# Patient Record
Sex: MALE | Race: WHITE | NOT HISPANIC OR LATINO
[De-identification: names, ages, dates, MRNs, and addresses within clinical notes are randomized per-mention and may not be internally consistent; named-entity substitution may affect disease eponyms.]

---

## 2017-06-19 ENCOUNTER — TRANSCRIPTION ENCOUNTER (OUTPATIENT)
Age: 67
End: 2017-06-19

## 2017-06-19 NOTE — ASU PATIENT PROFILE, ADULT - PSH
Artificial pacemaker    Cataract extraction status  bilateral  History of hernia repair    S/P AVR (aortic valve replacement)    S/P MVR (mitral valve replacement)

## 2017-06-19 NOTE — ASU PATIENT PROFILE, ADULT - PMH
Anemia    Epiglottitis    Essential hypertension    Gastroesophageal reflux disease, esophagitis presence not specified    HLD (hyperlipidemia)    HTN (hypertension)    Rheumatic fever with heart involvement

## 2017-06-20 ENCOUNTER — OUTPATIENT (OUTPATIENT)
Dept: OUTPATIENT SERVICES | Facility: HOSPITAL | Age: 67
LOS: 1 days | End: 2017-06-20
Payer: COMMERCIAL

## 2017-06-20 VITALS
DIASTOLIC BLOOD PRESSURE: 72 MMHG | OXYGEN SATURATION: 100 % | HEART RATE: 67 BPM | RESPIRATION RATE: 16 BRPM | SYSTOLIC BLOOD PRESSURE: 124 MMHG

## 2017-06-20 VITALS
RESPIRATION RATE: 12 BRPM | HEART RATE: 64 BPM | HEIGHT: 66 IN | SYSTOLIC BLOOD PRESSURE: 149 MMHG | OXYGEN SATURATION: 99 % | WEIGHT: 182.1 LBS | TEMPERATURE: 99 F | DIASTOLIC BLOOD PRESSURE: 84 MMHG

## 2017-06-20 DIAGNOSIS — Z98.890 OTHER SPECIFIED POSTPROCEDURAL STATES: Chronic | ICD-10-CM

## 2017-06-20 DIAGNOSIS — Z95.2 PRESENCE OF PROSTHETIC HEART VALVE: Chronic | ICD-10-CM

## 2017-06-20 DIAGNOSIS — Z95.0 PRESENCE OF CARDIAC PACEMAKER: Chronic | ICD-10-CM

## 2017-06-20 DIAGNOSIS — Z98.49 CATARACT EXTRACTION STATUS, UNSPECIFIED EYE: Chronic | ICD-10-CM

## 2017-06-20 DIAGNOSIS — H33.011 RETINAL DETACHMENT WITH SINGLE BREAK, RIGHT EYE: ICD-10-CM

## 2017-06-20 LAB
APTT BLD: 32.3 SEC — SIGNIFICANT CHANGE UP (ref 27.5–37.4)
INR BLD: 1.62 RATIO — HIGH (ref 0.88–1.16)
PROTHROM AB SERPL-ACNC: 17.8 SEC — HIGH (ref 9.8–12.7)

## 2017-06-20 PROCEDURE — 67108 REPAIR DETACHED RETINA: CPT | Mod: RT

## 2017-06-20 PROCEDURE — 85730 THROMBOPLASTIN TIME PARTIAL: CPT

## 2017-06-20 PROCEDURE — 85610 PROTHROMBIN TIME: CPT

## 2017-06-20 PROCEDURE — C1889: CPT

## 2017-06-20 NOTE — ASU DISCHARGE PLAN (ADULT/PEDIATRIC). - PT EDUC
other (specify)/Gas bubble instructions reviewed with good understanding ,gas bubble bracelet on pt. Eye shield with instructions , sunglasses and eye kit given to patient.

## 2017-06-20 NOTE — ASU DISCHARGE PLAN (ADULT/PEDIATRIC). - NOTIFY
Persistent Nausea and Vomiting/Pain not relieved by Medications/Bleeding that does not stop/Swelling that continues/Fever greater than 101

## 2019-08-05 PROBLEM — Z00.00 ENCOUNTER FOR PREVENTIVE HEALTH EXAMINATION: Status: ACTIVE | Noted: 2019-08-05

## 2019-08-05 PROBLEM — E78.5 HYPERLIPIDEMIA, UNSPECIFIED: Chronic | Status: ACTIVE | Noted: 2017-06-20

## 2019-08-05 PROBLEM — I01.9 ACUTE RHEUMATIC HEART DISEASE, UNSPECIFIED: Chronic | Status: ACTIVE | Noted: 2017-06-20

## 2019-08-05 PROBLEM — I10 ESSENTIAL (PRIMARY) HYPERTENSION: Chronic | Status: ACTIVE | Noted: 2017-06-20

## 2019-08-05 PROBLEM — D64.9 ANEMIA, UNSPECIFIED: Chronic | Status: ACTIVE | Noted: 2017-06-20

## 2019-08-07 ENCOUNTER — APPOINTMENT (OUTPATIENT)
Dept: PHYSICAL MEDICINE AND REHAB | Facility: CLINIC | Age: 69
End: 2019-08-07
Payer: MEDICARE

## 2019-08-07 VITALS — BODY MASS INDEX: 29.98 KG/M2 | HEIGHT: 67 IN | WEIGHT: 191 LBS

## 2019-08-07 DIAGNOSIS — Z86.79 PERSONAL HISTORY OF OTHER DISEASES OF THE CIRCULATORY SYSTEM: ICD-10-CM

## 2019-08-07 DIAGNOSIS — Z86.39 PERSONAL HISTORY OF OTHER ENDOCRINE, NUTRITIONAL AND METABOLIC DISEASE: ICD-10-CM

## 2019-08-07 PROCEDURE — 99214 OFFICE O/P EST MOD 30 MIN: CPT | Mod: 25

## 2019-08-07 PROCEDURE — 20552 NJX 1/MLT TRIGGER POINT 1/2: CPT

## 2019-08-07 RX ORDER — PANTOPRAZOLE SODIUM 40 MG/1
40 TABLET, DELAYED RELEASE ORAL
Refills: 0 | Status: ACTIVE | COMMUNITY

## 2019-08-07 RX ORDER — ATORVASTATIN CALCIUM 40 MG/1
40 TABLET, FILM COATED ORAL
Refills: 0 | Status: ACTIVE | COMMUNITY

## 2019-08-07 RX ORDER — LOSARTAN POTASSIUM AND HYDROCHLOROTHIAZIDE 25; 100 MG/1; MG/1
100-25 TABLET ORAL
Refills: 0 | Status: ACTIVE | COMMUNITY

## 2019-08-07 RX ORDER — CHROMIUM 200 MCG
TABLET ORAL
Refills: 0 | Status: ACTIVE | COMMUNITY

## 2019-08-07 RX ORDER — CETIRIZINE HYDROCHLORIDE 10 MG/1
CAPSULE, LIQUID FILLED ORAL
Refills: 0 | Status: ACTIVE | COMMUNITY

## 2019-08-07 RX ORDER — DOCUSATE SODIUM 100 MG/1
100 CAPSULE ORAL
Refills: 0 | Status: ACTIVE | COMMUNITY

## 2019-08-07 NOTE — PROCEDURE
[de-identified] : After informed consent,he elected to proceed with a trigger point injection into the left L5 paraspinals. I confirmed no prior adverse reactions, no active infections, and no relevant allergies. \par  \par The skin was prepped in the usual sterile manner. The sites were injected with local anesthetic followed by local needling. The injection was completed without complication and a bandage was applied. He tolerated the procedure well and was given post-injection instructions. \par  \par Cold Tx x 48 hours, analgesics prn. Medications: 0.5 ml of 1% Lidocaine per site\par \par \par

## 2019-08-07 NOTE — PHYSICAL EXAM
[FreeTextEntry1] : SIMA is a 69 year male \par Constitutional: healthy appearing, NAD, and overweight\par \par LUMBAR\par ROM: flexion to 30 deg, ext to 5 deg\par \par Gait: normal\par \par Inspection: no erythema, warmth\par Spine: no TTP in spinous process, SI joint, sacrum\par Bony palpation: no TTP in GT\par \par Soft tissue palpation hip: no TTP in gluteus sidra, medius\par Soft tissue palpation of spine: no TTP in lumbar paraspinals\par \par 5/5 bilateral HF, KE, DF, PF \par sensation intact in bilat LE\par reflexes: knee and ankle 0 bilat\par \par Special tests: neg seated SLR\par \par

## 2019-08-13 ENCOUNTER — APPOINTMENT (OUTPATIENT)
Dept: PHYSICAL MEDICINE AND REHAB | Facility: CLINIC | Age: 69
End: 2019-08-13
Payer: MEDICARE

## 2019-08-13 PROCEDURE — 64483 NJX AA&/STRD TFRM EPI L/S 1: CPT | Mod: LT

## 2019-08-13 PROCEDURE — 64484 NJX AA&/STRD TFRM EPI L/S EA: CPT | Mod: LT

## 2019-09-26 ENCOUNTER — APPOINTMENT (OUTPATIENT)
Dept: PHYSICAL MEDICINE AND REHAB | Facility: CLINIC | Age: 69
End: 2019-09-26
Payer: COMMERCIAL

## 2019-09-26 PROCEDURE — 99214 OFFICE O/P EST MOD 30 MIN: CPT

## 2019-09-26 NOTE — ASSESSMENT
[FreeTextEntry1] : Had episode of 5 days of Afib.  Surgeon said no blood thinners needed while Cardiologist said he needs it. Taught wall squat.  Educated to lose weight.

## 2019-09-26 NOTE — PHYSICAL EXAM
[FreeTextEntry1] : SIMA is a 69 year male \par Constitutional: healthy appearing, NAD, and overweight\par \par LUMBAR\par ROM: flexion to 30 deg, ext to 5 deg\par \par Gait: normal\par \par Inspection: no erythema, warmth\par Spine: no TTP in spinous process, SI joint, sacrum\par Bony palpation: no TTP in GT\par \par Soft tissue palpation hip: no TTP in gluteus sidra, medius\par Soft tissue palpation of spine: no TTP in lumbar paraspinals\par \par 5/5 bilateral HF, KE, DF, PF \par sensation intact in bilat LE\par reflexes: knee and ankle 0 bilat\par \par Special tests: neg seated SLR\par

## 2019-09-26 NOTE — HISTORY OF PRESENT ILLNESS
[FreeTextEntry1] : Location: back\par Quality: sharp \par Severity: no pain\par Duration: over 2 yr \par Timing: recurrent \par Context: atraumatic \par Aggravating Factors: standing; walking \par Alleviating Factors: narcotics; BENITA \par Associated Symptoms: no weakness; no numbness/tingling; no locking; no weight loss; no fever; no chills; no change in bowel/bladder habits; no swelling; no redness; no warmth; no popping; no clicking; no ecchymosis; +radiation down to left groin; stiffness\par Prior Studies: CT scan\par \par 3/2015 treated with Abx for suspected pneumonia but pain did not resolve. GI doctor got a CT scan showing diverticulitis. Went to ER as well.\par 8/2017 right L5 and S1 BENITA - 100% relief\par 8/2019 left L5 and S1 BENITA - 100% relief after 1 wk, it was severe around day 4-5

## 2020-12-29 ENCOUNTER — APPOINTMENT (OUTPATIENT)
Dept: ORTHOPEDIC SURGERY | Facility: CLINIC | Age: 70
End: 2020-12-29
Payer: COMMERCIAL

## 2020-12-29 VITALS — HEIGHT: 67 IN | BODY MASS INDEX: 29.98 KG/M2 | WEIGHT: 191 LBS

## 2020-12-29 VITALS — TEMPERATURE: 93.6 F

## 2020-12-29 DIAGNOSIS — M75.50 BURSITIS OF UNSPECIFIED SHOULDER: ICD-10-CM

## 2020-12-29 PROCEDURE — 99072 ADDL SUPL MATRL&STAF TM PHE: CPT

## 2020-12-29 PROCEDURE — 99203 OFFICE O/P NEW LOW 30 MIN: CPT

## 2020-12-29 PROCEDURE — 73060 X-RAY EXAM OF HUMERUS: CPT | Mod: LT

## 2020-12-30 PROBLEM — M75.50 SHOULDER BURSITIS: Status: ACTIVE | Noted: 2020-12-30

## 2020-12-30 NOTE — HISTORY OF PRESENT ILLNESS
[de-identified] : Location: Left lateral arm\par Duration: 1 month\par Context: atraumatic\par Quality: burning, stiffness\par Aggravating factors: carrying heavy bags, reaching behind\par Associated symptoms: N/A\par Conservative treatment: icy hot patch, Tylenol\par Prior studies: N/A

## 2020-12-30 NOTE — PHYSICAL EXAM
[de-identified] : Left Shoulder:\par Constitutional:\par The patient is healthy-appearing and in no apparent distress. \par \par Cardiovascular System: \par The capillary refill is less than 2 seconds. \par \par Skin: \par There are no skin abnormalities.\par \par C-Spine/Neck:\par \par Active Range of Motion:\par Flexion				50\par Extension			60\par Lateral rotation			80  \par \par Left Shoulder: \par Inspection: \par There is no atrophy, erythema, warmth, swelling.\par There is no scapular winging.\par There is no AC prominence. \par \par Bony Palpation: \par There is no tenderness of the clavicle.\par There is no tenderness of the acromioclavicular joint.\par There is no tenderness of the greater tuberosity. \par There is no tenderness of the bicipital groove.\par  \par Soft Tissue Palpation: \par There is no tenderness of the trapezius.\par There is no tenderness of the rhomboid.\par There is no tenderness of the subacromial bursa. \par \par Active Range of Motion: \par Forward flexion- 				180 \par Abduction-					150\par External rotation at 0 degrees abduction-	80 \par Internal rotation at 0 degrees abduction-	80\par \par Passive Range of Motion: \par Forward flexion- 			180 \par Abduction-				150\par External rotation at 0 deg abduction-	80 \par Internal rotation at 0 deg abduction-	80\par \par Strength:\par Supraspinatus / Abduction                  5/5\par External rotation                                 5/5\par Internal roation                                   5/5\par \par Special Tests: \par Hawkin's  				Positive \par Neer's  				Positive \par Speed's  				Negative\par AC cross-over 			            Negative\par Morrill's  				Negative\par \par Neurological System: \par \par There is normal sensation to light touch C5-T1. \par \par Stability: \par There is no general laxity. \par \par Psychiatric: \par The patient demonstrates a normal mood and affect and is active and alert [de-identified] : X-ray left humerus.  There is no significant bony / soft tissue abnormality, arthritis, or fracture other than a pacemaker.

## 2020-12-30 NOTE — ASSESSMENT
[FreeTextEntry1] : Discussed at length with patient exam history and imaging.  Patient this time elects home exercises and if no improvement consideration to CT as patient is unable to obtain an MRI.

## 2021-03-17 ENCOUNTER — APPOINTMENT (OUTPATIENT)
Dept: PHYSICAL MEDICINE AND REHAB | Facility: CLINIC | Age: 71
End: 2021-03-17
Payer: COMMERCIAL

## 2021-03-17 DIAGNOSIS — M25.859 OTHER SPECIFIED JOINT DISORDERS, UNSPECIFIED HIP: ICD-10-CM

## 2021-03-17 DIAGNOSIS — Z86.03 PERSONAL HISTORY OF NEOPLASM OF UNCERTAIN BEHAVIOR: ICD-10-CM

## 2021-03-17 DIAGNOSIS — Z85.46 PERSONAL HISTORY OF MALIGNANT NEOPLASM OF PROSTATE: ICD-10-CM

## 2021-03-17 PROCEDURE — 99072 ADDL SUPL MATRL&STAF TM PHE: CPT

## 2021-03-17 PROCEDURE — 20552 NJX 1/MLT TRIGGER POINT 1/2: CPT

## 2021-03-17 PROCEDURE — 73502 X-RAY EXAM HIP UNI 2-3 VIEWS: CPT | Mod: LT

## 2021-03-17 PROCEDURE — 72100 X-RAY EXAM L-S SPINE 2/3 VWS: CPT

## 2021-03-17 PROCEDURE — 99214 OFFICE O/P EST MOD 30 MIN: CPT | Mod: 25

## 2021-03-17 RX ORDER — METRONIDAZOLE 10 MG/G
GEL TOPICAL
Refills: 0 | Status: ACTIVE | COMMUNITY

## 2021-03-17 RX ORDER — HYDROCODONE BITARTRATE AND ACETAMINOPHEN 5; 325 MG/1; MG/1
5-325 TABLET ORAL EVERY 8 HOURS
Qty: 21 | Refills: 0 | Status: DISCONTINUED | COMMUNITY
Start: 2019-08-07 | End: 2021-03-17

## 2021-03-17 NOTE — PHYSICAL EXAM
[FreeTextEntry1] : SIMA is a 70 year male \par Constitutional: healthy appearing, NAD, and overweight\par \par LUMBAR\par ROM: flexion to 30 deg, ext to 5 deg\par \par Gait: normal\par \par Inspection: no erythema, warmth\par Spine: no TTP in spinous process, SI joint, sacrum\par Bony palpation: no TTP in GT\par \par Soft tissue palpation hip: no TTP in gluteus sidra, medius\par Soft tissue palpation of spine: TTP in left lumbar paraspinals\par \par 5/5 bilateral HF, KE, DF, PF \par sensation intact in bilat LE\par reflexes: knee and ankle 0 bilat\par \par Special tests: neg seated SLR, neg FADIR in left hip

## 2021-03-17 NOTE — ASSESSMENT
[FreeTextEntry1] : MGUS and prostate cancer are stable and he is doing active surveillance.\par \par Hip impingement is asymptomatic at this time.\par \par Ice area often. Take tylenol prn.  Has to limit nsaids.    Continue HEP. \par \par Having 2nd vaccine April 2 so can do BENITA 2 wks after. \par \par Does not have PCP.  See cardiologist for aortic calcification. Wrote it down for him. \par \par

## 2021-03-17 NOTE — DATA REVIEWED
[Plain X-Rays] : plain X-Rays [FreeTextEntry1] : Office x-rays of the lumbar spine AP and lateral show bridging osteophytes from T12-L3, anterior osteophytes at the other lumbar levels mild DDD at L5-S1.  Incident mild aortic calcification.\par \par Office X-rays of the left hip AP and lateral show osteophytes at the lateral acetabulum causing mild arthritis, mild cam impingement

## 2021-03-17 NOTE — HISTORY OF PRESENT ILLNESS
[FreeTextEntry1] : Location: back\par Quality: sharp \par Severity: 7/10, worse last couple months after prostate biopsy\par Duration: over 2 yr \par Timing: recurrent \par Context: atraumatic \par Aggravating Factors: standing; walking \par Alleviating Factors: narcotics; BENITA \par Associated Symptoms: no weakness; no numbness/tingling; no locking; no weight loss; no fever; no chills; no change in bowel/bladder habits; +radiation down to left groin; stiffness\par Prior Studies: CT scan 2017\par \par 3/2015 treated with Abx for suspected pneumonia but pain did not resolve. GI doctor got a CT scan showing diverticulitis. Went to ER as well.\par 8/2017 right L5 and S1 BENITA - 100% relief\par 8/2019 left L5 and S1 BENITA - 100% relief after 1 wk, it was severe around day 4-5 \par

## 2021-03-17 NOTE — PROCEDURE
[de-identified] : After informed consent,he elected to proceed with a trigger point injection into the left L5 paraspinals. I confirmed no prior adverse reactions, no active infections, and no relevant allergies. \par  \par The skin was prepped in the usual sterile manner.  The sites were injected with local anesthetic followed by local needling. The injection was completed without complication and a bandage was applied. He tolerated the procedure well and was given post-injection instructions. \par  \par Cold Tx x 48 hours, analgesics prn. Medications: 0.5 ml of 1% Lidocaine per site\par \par \par

## 2021-05-27 ENCOUNTER — TRANSCRIPTION ENCOUNTER (OUTPATIENT)
Age: 71
End: 2021-05-27

## 2021-05-27 ENCOUNTER — APPOINTMENT (OUTPATIENT)
Dept: ORTHOPEDIC SURGERY | Facility: CLINIC | Age: 71
End: 2021-05-27
Payer: MEDICARE

## 2021-05-27 DIAGNOSIS — M25.511 PAIN IN RIGHT SHOULDER: ICD-10-CM

## 2021-05-27 DIAGNOSIS — M54.2 CERVICALGIA: ICD-10-CM

## 2021-05-27 DIAGNOSIS — M89.8X1 OTHER SPECIFIED DISORDERS OF BONE, SHOULDER: ICD-10-CM

## 2021-05-27 PROCEDURE — 99214 OFFICE O/P EST MOD 30 MIN: CPT

## 2021-05-27 PROCEDURE — 72040 X-RAY EXAM NECK SPINE 2-3 VW: CPT

## 2021-05-27 PROCEDURE — 73030 X-RAY EXAM OF SHOULDER: CPT | Mod: RT

## 2021-05-27 RX ORDER — METAXALONE 800 MG/1
800 TABLET ORAL 3 TIMES DAILY
Qty: 30 | Refills: 1 | Status: ACTIVE | COMMUNITY
Start: 2021-05-27 | End: 1900-01-01

## 2021-05-27 NOTE — HISTORY OF PRESENT ILLNESS
[de-identified] : Location: Right shoulder blade \par Duration: few days\par Context: atraumatic\par Quality: soreness\par Aggravating factors: reaching behind, ROM\par Associated symptoms: radiating pain into the right anterior pec muscle\par Conservative treatment: Tylenol, icy hot\par Prior studies: N/A\par

## 2021-05-27 NOTE — ASSESSMENT
[FreeTextEntry1] : Discussed at length with patient exam history and imaging.  Patient elects physical therapy home exercise as well as muscle relaxer at this time.  If no improvement patient go for MRI of cervical spine\par \par \par

## 2021-05-27 NOTE — PHYSICAL EXAM
[de-identified] : Right Shoulder:\par Constitutional:\par The patient is healthy-appearing and in no apparent distress. \par \par Cardiovascular System: \par The capillary refill is less than 2 seconds. \par \par Skin: \par There are no skin abnormalities.\par \par C-Spine/Neck:\par \par Active Range of Motion:\par Flexion				50\par Extension			60\par Lateral rotation			80  \par \par Right Shoulder: \par Inspection: \par There is no atrophy, erythema, warmth, swelling.\par There is no scapular winging.\par There is no AC prominence. \par \par Bony Palpation: \par There is no tenderness of the clavicle.\par There is no tenderness of the acromioclavicular joint.\par There is no tenderness of the greater tuberosity. \par There is no tenderness of the bicipital groove.\par  \par Soft Tissue Palpation: \par There is tenderness of the trapezius.\par There is tenderness of the rhomboid.\par There is no tenderness of the subacromial bursa. \par \par Active Range of Motion: \par Forward flexion- 				180 \par Abduction-					150\par External rotation at 0 degrees abduction-	80 \par Internal rotation at 0 degrees abduction-	80\par \par Passive Range of Motion: \par Forward flexion- 			180 \par Abduction-				150\par External rotation at 0 deg abduction-	80 \par Internal rotation at 0 deg abduction-	80\par \par Special Tests: \par Hawkin's  				Negative \par Neer's  				Negative\par Speed's  				Negative\par AC cross-over 			            Negative\par [place]'s  				Negative\par \par Stability: \par There is no general laxity. \par \par Psychiatric: \par The patient demonstrates a normal mood and affect and is active and alert [de-identified] : Xray RIGHT shoulder:  There is no significant bony / soft tissue abnormality, arthritis, or fracture.\par \par X-rays cervical spine.  There is mild C5-6 arthritis with loss of lordosis

## 2022-09-06 ENCOUNTER — APPOINTMENT (OUTPATIENT)
Dept: PHYSICAL MEDICINE AND REHAB | Facility: CLINIC | Age: 72
End: 2022-09-06

## 2022-09-06 DIAGNOSIS — M54.6 PAIN IN THORACIC SPINE: ICD-10-CM

## 2022-09-06 PROCEDURE — 99213 OFFICE O/P EST LOW 20 MIN: CPT | Mod: 25

## 2022-09-06 PROCEDURE — 20552 NJX 1/MLT TRIGGER POINT 1/2: CPT

## 2022-09-06 NOTE — ASSESSMENT
[FreeTextEntry1] : stenosis - Discussed diagnosis and treatment plan including PT.\par continue HEP\par ice area often\par \par thoracic - nsaids irritate his stomach so ice area often

## 2022-09-06 NOTE — HISTORY OF PRESENT ILLNESS
[FreeTextEntry1] : Location: upper and lower back\par Quality: sharp \par Severity: 7/10, worse lately\par Duration: over 2 yr \par Timing: recurrent \par Context: atraumatic \par Aggravating Factors: standing; walking, prostate biopsy\par Alleviating Factors: narcotics; BENITA \par Associated Symptoms: no weakness; no numbness/tingling; no locking; no weight loss; no fever; no chills; no change in bowel/bladder habits; +radiation down to left groin; stiffness\par Prior Studies: CT scan 2017\par \par 3/2015 treated with Abx for suspected pneumonia but pain did not resolve. GI doctor got a CT scan showing diverticulitis. Went to ER as well.\par 8/2017 right L5 and S1 BENITA - 100% relief\par 8/2019 left L5 and S1 BENITA - 100% relief after 1 wk, it was severe around day 4-5 \par \par

## 2022-09-06 NOTE — PROCEDURE
[de-identified] : Indication: myofascial pain\par \par After informed consent,he elected to proceed with a trigger point injection into the bilateral L5 paraspinals. I confirmed no prior adverse reactions, no active infections, and no relevant allergies. \par  \par The skin was prepped in the usual sterile manner.  The sites were injected with Lidocaine followed by local needling. The injection was completed without complication and a bandage was applied. He tolerated the procedure well and was given post-injection instructions. \par  \par Cold Tx x 48 hours, analgesics prn. Medications: 0.5 ml of 1% Lidocaine per site\par \par Exp 5/2023\par Manufacture: [employer]\par NDC [id number]\par [id number]\par \par Exp 5/2023\par Manufacture: [employer]\par NDC [id number]\par [id number]\par

## 2022-09-06 NOTE — PHYSICAL EXAM
[FreeTextEntry1] : SIMA is a 72 year male \par Constitutional: healthy appearing, NAD, and overweight\par \par LUMBAR\par ROM: flexion to 30 deg, ext to 5 deg\par \par Gait: normal\par \par Inspection: no erythema, warmth\par Spine: no TTP in spinous process, SI joint, sacrum\par Bony palpation: no TTP in GT\par \par Soft tissue palpation hip: no TTP in gluteus sidra, medius\par Soft tissue palpation of spine: TTP in  lumbar paraspinals, no TTP in rhomboids or scapula\par \par 5/5 bilateral HF, KE, DF, PF \par sensation intact in bilat LE\par

## 2022-09-13 ENCOUNTER — NON-APPOINTMENT (OUTPATIENT)
Age: 72
End: 2022-09-13

## 2022-10-03 ENCOUNTER — APPOINTMENT (OUTPATIENT)
Dept: PHYSICAL MEDICINE AND REHAB | Facility: CLINIC | Age: 72
End: 2022-10-03

## 2022-10-03 DIAGNOSIS — R73.03 PREDIABETES.: ICD-10-CM

## 2022-10-03 DIAGNOSIS — M47.816 SPONDYLOSIS W/OUT MYELOPATHY OR RADICULOPATHY, LUMBAR REGION: ICD-10-CM

## 2022-10-03 PROCEDURE — 64493 INJ PARAVERT F JNT L/S 1 LEV: CPT | Mod: RT

## 2022-10-03 PROCEDURE — 64494 INJ PARAVERT F JNT L/S 2 LEV: CPT | Mod: RT

## 2022-10-03 RX ORDER — METFORMIN ER 500 MG 500 MG/1
500 TABLET ORAL
Qty: 60 | Refills: 0 | Status: ACTIVE | COMMUNITY
Start: 2022-09-26

## 2022-10-03 RX ORDER — HYDROCORTISONE 2.5% 25 MG/G
2.5 CREAM TOPICAL
Qty: 30 | Refills: 0 | Status: ACTIVE | COMMUNITY
Start: 2022-09-08

## 2022-10-18 RX ORDER — HYDROCODONE BITARTRATE AND ACETAMINOPHEN 5; 325 MG/1; MG/1
5-325 TABLET ORAL
Qty: 12 | Refills: 0 | Status: ACTIVE | COMMUNITY
Start: 2022-10-18 | End: 1900-01-01

## 2022-10-31 ENCOUNTER — APPOINTMENT (OUTPATIENT)
Dept: PHYSICAL MEDICINE AND REHAB | Facility: CLINIC | Age: 72
End: 2022-10-31

## 2022-10-31 PROCEDURE — 64484 NJX AA&/STRD TFRM EPI L/S EA: CPT | Mod: RT

## 2022-10-31 PROCEDURE — 64483 NJX AA&/STRD TFRM EPI L/S 1: CPT | Mod: RT

## 2022-10-31 RX ORDER — HYDROCORTISONE 25 MG/G
2.5 CREAM TOPICAL
Qty: 28 | Refills: 0 | Status: ACTIVE | COMMUNITY
Start: 2022-10-13

## 2022-10-31 RX ORDER — LOSARTAN POTASSIUM AND HYDROCHLOROTHIAZIDE 12.5; 5 MG/1; MG/1
50-12.5 TABLET ORAL
Qty: 90 | Refills: 0 | Status: ACTIVE | COMMUNITY
Start: 2022-07-13

## 2022-10-31 RX ORDER — METOPROLOL SUCCINATE 200 MG/1
200 TABLET, EXTENDED RELEASE ORAL
Qty: 90 | Refills: 0 | Status: ACTIVE | COMMUNITY
Start: 2022-10-04

## 2022-10-31 RX ORDER — LOSARTAN POTASSIUM 50 MG/1
50 TABLET, FILM COATED ORAL
Qty: 90 | Refills: 0 | Status: ACTIVE | COMMUNITY
Start: 2022-10-04

## 2022-10-31 RX ORDER — AMOXICILLIN 500 MG/1
500 CAPSULE ORAL
Qty: 20 | Refills: 0 | Status: DISCONTINUED | COMMUNITY
Start: 2022-10-05

## 2022-10-31 RX ORDER — CEPHALEXIN 500 MG/1
500 CAPSULE ORAL
Qty: 21 | Refills: 0 | Status: DISCONTINUED | COMMUNITY
Start: 2022-06-28

## 2022-10-31 RX ORDER — TRIAMCINOLONE ACETONIDE 0.25 MG/G
0.03 CREAM TOPICAL
Qty: 80 | Refills: 0 | Status: ACTIVE | COMMUNITY
Start: 2022-07-21

## 2022-11-21 ENCOUNTER — APPOINTMENT (OUTPATIENT)
Dept: PHYSICAL MEDICINE AND REHAB | Facility: CLINIC | Age: 72
End: 2022-11-21

## 2022-11-21 PROCEDURE — 99214 OFFICE O/P EST MOD 30 MIN: CPT

## 2022-11-21 NOTE — ASSESSMENT
[FreeTextEntry1] : Discussed diagnosis and treatment plan including PT.\par Recommended PT but he wants to do HEP.\par Taught bridges.  Reviewed exercises.\par Warned of sedation with gabapentin. \par \par f/u prn
[FreeTextEntry1] : The pateint has had no further chest pain . The patient has not been eating well and has had a decreased appetite . The patient had a STEMI one year ago with STEM  with RCA occlusion and D1 80% in the ostium of the vessel. The patient has been feeling well overall  . The patient has had no chest pain .Feels lightheaded usually when standing  up from a seated position .

## 2022-11-21 NOTE — PHYSICAL EXAM
[FreeTextEntry1] : SIMA is a 72 year male \par Constitutional: healthy appearing, NAD, and overweight\par \par LUMBAR\par ROM: flexion to 30 deg, ext to 5 deg\par \par Gait: normal\par \par Inspection: no erythema, warmth\par Spine: no TTP in spinous process, SI joint, sacrum\par Bony palpation: no TTP in GT\par \par Soft tissue palpation hip: no TTP in gluteus sidra, medius\par Soft tissue palpation of spine: TTP in lumbar paraspinals, no TTP in rhomboids or scapula\par \par 5/5 bilateral  KE, DF, PF \par sensation intact in bilat LE

## 2022-11-21 NOTE — HISTORY OF PRESENT ILLNESS
[FreeTextEntry1] : Location: upper and lower back\par Quality: sharp \par Severity: 0/10,\par Duration: over 3 yr \par Timing: recurrent \par Context: atraumatic \par Aggravating Factors: standing; walking, prostate biopsy\par Alleviating Factors: narcotics; BENITA \par Associated Symptoms: no weakness; no numbness/tingling; no locking; no weight loss; no fever; no chills; no change in bowel/bladder habits; +radiation down to left groin; stiffness\par Prior Studies: CT scan 2022\par \par 3/2015 treated with Abx for suspected pneumonia but pain did not resolve. GI doctor got a CT scan showing diverticulitis. Went to ER as well.\par 8/2017 right L5 and S1 BENITA - 100% relief\par 8/2019 left L5 and S1 BENITA - 100% relief after 1 wk, it was severe around day 4-5 \par 10/2022 right L4/5 and L5/S1 facet injection\par 10/2022 right L5 and S1 BENITA - over 90% relief but took 4-5 days too work

## 2023-08-29 ENCOUNTER — APPOINTMENT (OUTPATIENT)
Dept: ORTHOPEDIC SURGERY | Facility: CLINIC | Age: 73
End: 2023-08-29
Payer: MEDICARE

## 2023-08-29 DIAGNOSIS — M25.561 PAIN IN RIGHT KNEE: ICD-10-CM

## 2023-08-29 DIAGNOSIS — M25.562 PAIN IN RIGHT KNEE: ICD-10-CM

## 2023-08-29 PROCEDURE — 99214 OFFICE O/P EST MOD 30 MIN: CPT

## 2023-08-29 NOTE — REASON FOR VISIT
[Initial Visit] : an initial visit for [Knee Pain] : knee pain [FreeTextEntry2] : REFERRED BY DR BRAUN, HE IS HERE FOR BILATERAL KNEE PAIN.

## 2023-08-29 NOTE — DISCUSSION/SUMMARY
[de-identified] : Patient I discussed at length the underlying etiology of his bilateral knee pain.  I believe that he can try some over-the-counter anti-inflammatories and icing of the knees if does not see significant improvement we may consider either cortisone injections or HA injections for both knees due to the mild to moderate patellofemoral arthritis.  Today's consultation lasted 30 minutes.

## 2023-08-29 NOTE — PHYSICAL EXAM
[de-identified] : Right knee has minimal warmth range of motion 0 to 125 degrees knee stable to stress varus valgus stress in both full extension and 90 degrees of flexion there is some mild tenderness with compression of the lateral patellofemoral facet.  Right knee similar findings minimal warmth range of motion 0 to 125 degrees knee stable on exam today.  Range of motion exhibits mild crepitus and tenderness to compression of the lateral patellofemoral facet. [de-identified] : Patient was sent for outside radiographs today they are available for review the patient has minimal arthritic changes of the tibiofemoral articulation on standing AP projection there is moderate narrowing of the lateral patellofemoral facet on the sunrise view of both the right and left knee.  More advanced on the left.

## 2023-08-29 NOTE — HISTORY OF PRESENT ILLNESS
[de-identified] : First-time visit for this patient is here with complaint of bilateral knee pain.  Patient has difficulty with stair climbing complains of pain in anterolateral aspect of each knee.  Patient states that he is not taking any medication for this at this point.  He is here for first-time consultation.

## 2023-09-11 ENCOUNTER — APPOINTMENT (OUTPATIENT)
Dept: PHYSICAL MEDICINE AND REHAB | Facility: CLINIC | Age: 73
End: 2023-09-11
Payer: MEDICARE

## 2023-09-11 PROCEDURE — 99214 OFFICE O/P EST MOD 30 MIN: CPT

## 2023-09-12 ENCOUNTER — APPOINTMENT (OUTPATIENT)
Dept: ORTHOPEDIC SURGERY | Facility: CLINIC | Age: 73
End: 2023-09-12
Payer: MEDICARE

## 2023-09-12 DIAGNOSIS — M17.0 BILATERAL PRIMARY OSTEOARTHRITIS OF KNEE: ICD-10-CM

## 2023-09-12 PROCEDURE — 99214 OFFICE O/P EST MOD 30 MIN: CPT | Mod: 25

## 2023-09-12 PROCEDURE — 20611 DRAIN/INJ JOINT/BURSA W/US: CPT | Mod: 50

## 2023-09-12 RX ORDER — HYALURONATE SODIUM, STABILIZED 88 MG/4 ML
88 SYRINGE (ML) INTRAARTICULAR
Qty: 2 | Refills: 0 | Status: ACTIVE | OUTPATIENT
Start: 2023-09-12

## 2023-09-13 ENCOUNTER — TRANSCRIPTION ENCOUNTER (OUTPATIENT)
Age: 73
End: 2023-09-13

## 2023-09-21 ENCOUNTER — NON-APPOINTMENT (OUTPATIENT)
Age: 73
End: 2023-09-21

## 2023-09-21 DIAGNOSIS — M54.16 RADICULOPATHY, LUMBAR REGION: ICD-10-CM

## 2023-10-05 ENCOUNTER — APPOINTMENT (OUTPATIENT)
Dept: ORTHOPEDIC SURGERY | Facility: CLINIC | Age: 73
End: 2023-10-05
Payer: MEDICARE

## 2023-10-05 VITALS — WEIGHT: 176 LBS | BODY MASS INDEX: 27.62 KG/M2 | HEIGHT: 67 IN

## 2023-10-05 DIAGNOSIS — E66.3 OVERWEIGHT: ICD-10-CM

## 2023-10-05 PROCEDURE — 99213 OFFICE O/P EST LOW 20 MIN: CPT | Mod: 25

## 2023-10-05 PROCEDURE — 20610 DRAIN/INJ JOINT/BURSA W/O US: CPT | Mod: 50

## 2023-10-09 ENCOUNTER — APPOINTMENT (OUTPATIENT)
Dept: PHYSICAL MEDICINE AND REHAB | Facility: CLINIC | Age: 73
End: 2023-10-09
Payer: MEDICARE

## 2023-10-09 PROCEDURE — 62323 NJX INTERLAMINAR LMBR/SAC: CPT

## 2023-10-23 ENCOUNTER — APPOINTMENT (OUTPATIENT)
Dept: PHYSICAL MEDICINE AND REHAB | Facility: CLINIC | Age: 73
End: 2023-10-23
Payer: MEDICARE

## 2023-10-23 PROCEDURE — 99213 OFFICE O/P EST LOW 20 MIN: CPT

## 2023-11-13 ENCOUNTER — APPOINTMENT (OUTPATIENT)
Dept: PHYSICAL MEDICINE AND REHAB | Facility: CLINIC | Age: 73
End: 2023-11-13
Payer: MEDICARE

## 2023-11-13 PROCEDURE — 64483 NJX AA&/STRD TFRM EPI L/S 1: CPT | Mod: 50

## 2023-11-27 ENCOUNTER — APPOINTMENT (OUTPATIENT)
Dept: PHYSICAL MEDICINE AND REHAB | Facility: CLINIC | Age: 73
End: 2023-11-27
Payer: MEDICARE

## 2023-11-27 DIAGNOSIS — M75.41 IMPINGEMENT SYNDROME OF RIGHT SHOULDER: ICD-10-CM

## 2023-11-27 PROCEDURE — 99213 OFFICE O/P EST LOW 20 MIN: CPT

## 2024-04-28 ENCOUNTER — NON-APPOINTMENT (OUTPATIENT)
Age: 74
End: 2024-04-28

## 2024-05-10 RX ORDER — APIXABAN 5 MG/1
TABLET, FILM COATED ORAL
Refills: 0 | Status: ACTIVE | COMMUNITY

## 2024-05-10 RX ORDER — CELECOXIB 200 MG/1
200 CAPSULE ORAL
Qty: 24 | Refills: 1 | Status: DISCONTINUED | COMMUNITY
Start: 2023-09-04 | End: 2024-05-10

## 2024-05-13 ENCOUNTER — APPOINTMENT (OUTPATIENT)
Dept: PHYSICAL MEDICINE AND REHAB | Facility: CLINIC | Age: 74
End: 2024-05-13
Payer: MEDICARE

## 2024-05-13 DIAGNOSIS — M51.26 OTHER INTERVERTEBRAL DISC DISPLACEMENT, LUMBAR REGION: ICD-10-CM

## 2024-05-13 PROCEDURE — 20552 NJX 1/MLT TRIGGER POINT 1/2: CPT

## 2024-05-13 PROCEDURE — 99213 OFFICE O/P EST LOW 20 MIN: CPT | Mod: 25

## 2024-05-13 PROCEDURE — 72040 X-RAY EXAM NECK SPINE 2-3 VW: CPT

## 2024-05-13 RX ORDER — METOPROLOL SUCCINATE 100 MG/1
100 TABLET, EXTENDED RELEASE ORAL
Refills: 0 | Status: DISCONTINUED | COMMUNITY
End: 2024-05-13

## 2024-05-13 RX ORDER — GABAPENTIN 100 MG/1
100 CAPSULE ORAL
Qty: 30 | Refills: 0 | Status: DISCONTINUED | COMMUNITY
Start: 2022-11-21 | End: 2024-05-13

## 2024-05-13 NOTE — HISTORY OF PRESENT ILLNESS
[FreeTextEntry1] : Location: back and legs Severity: 1/10 Duration: over 3 yr Timing: recurrent Context: atraumatic Aggravating Factors: no pattern; in past prostate biopsy Alleviating Factors: narcotics; BENITA, walking it off Associated Symptoms: no weakness; no numbness/tingling; no locking; no weight loss; no fever; no chills; no change in bowel/bladder habits; no more radiation down to legs anteriorly; +stiffness, cramp in right lateral ankle Thanksgiving am Prior Studies: CT scan 2023 8/2019 left L5 and S1 BENITA - 100% relief after 1 wk, it was severe around day 4-5 10/2022 right L4/5 and L5/S1 facet injection 10/2022 right L5 and S1 BENITA - over 90% relief but took 4-5 days too work 10/2023 caudal BENITA  Right upper back hurting lately.  It went away after stretching.  Pain with movement.  No pain down arms, numbness.

## 2024-05-13 NOTE — PROCEDURE
[de-identified] : Indication: myofascial pain   After informed consent, he elected to proceed with a trigger point injection into the left trapezius. I confirmed no prior adverse reactions, no active infections, and no relevant allergies.   The skin was prepped in the usual sterile manner. The muscles were pinched and elevated from the chest wall to avoid puncturing the lung. The sites were injected with local anesthetic followed by local needling. The injection was completed without complication and a bandage was applied. He tolerated the procedure well and was given post-injection instructions.   Cold Tx x 48 hours, analgesics prn. Medications: 0.5 ml of 1% Lidocaine per site       Exp 7/2025 Manufacture: [employer] NDC [id number] LOT [id number]

## 2024-05-13 NOTE — PHYSICAL EXAM
[FreeTextEntry1] : SIMA is a 73 year old male  Constitutional: healthy appearing, NAD, and overweight  LUMBAR  ROM: flexion to 30 deg, ext to 5 deg  Gait: normal  Inspection: no erythema, warmth Spine: no TTP in spinous process, SI joint, sacrum Bony palpation: no TTP in GT  Soft tissue palpation hip: no TTP in gluteus sidra, medius Soft tissue palpation of spine: TTP in lumbar paraspinals  5/5 bilateral KE, DF, PF sensation intact in bilat LE   NECK ROM: flexion to 30, ext to 30, rotation to 45 deg bilat  Inspection: no erythema, warmth Spine: no TTP in spinous process Soft tissue palpation: no TTP in cervical paraspinals, rhomboids, trapezius  5/5 bilateral elb flex/ext, WE, finger abd/flex sensation intact in bilat UE   Special tests: neg Spurling, Palacio

## 2024-05-13 NOTE — ASSESSMENT
[FreeTextEntry1] : complicated by recent Afib after viral infection  lumbar - Discussed diagnosis and treatment plan including PT. get back to HEP regularly  cervical - Discussed diagnosis and treatment plan including PT. do row exercise  f/u 4 wk or sooner if pain worsens

## 2024-05-17 ENCOUNTER — APPOINTMENT (OUTPATIENT)
Dept: PHYSICAL MEDICINE AND REHAB | Facility: CLINIC | Age: 74
End: 2024-05-17

## 2024-05-30 ENCOUNTER — APPOINTMENT (OUTPATIENT)
Dept: ORTHOPEDIC SURGERY | Facility: CLINIC | Age: 74
End: 2024-05-30
Payer: MEDICARE

## 2024-05-30 DIAGNOSIS — S80.02XA CONTUSION OF LEFT KNEE, INITIAL ENCOUNTER: ICD-10-CM

## 2024-05-30 PROCEDURE — 99214 OFFICE O/P EST MOD 30 MIN: CPT

## 2024-06-04 PROBLEM — S80.02XA CONTUSION OF LEFT KNEE, INITIAL ENCOUNTER: Status: ACTIVE | Noted: 2024-06-04

## 2024-06-04 RX ORDER — BACLOFEN 5 MG/1
5 TABLET ORAL
Qty: 50 | Refills: 0 | Status: ACTIVE | COMMUNITY
Start: 2024-05-13 | End: 1900-01-01

## 2024-06-04 NOTE — HISTORY OF PRESENT ILLNESS
[de-identified] : Well-known patient returns today patient with a recent fall of both of his knees he is complaining about some swelling and some warmth about the knee.  He is also complaining of some excessive bruising but recall patient is on blood thinners currently.

## 2024-06-04 NOTE — PHYSICAL EXAM
[de-identified] : Left knee patient has some mild ecchymosis in the area of the patella but the extensor mechanism is intact there is no medial or lateral joint line tenderness range of motion 0 to 125 degrees knee stable extra stress functions with extension and 90 degrees of flexion no obvious effusion noted.

## 2024-06-04 NOTE — DISCUSSION/SUMMARY
[de-identified] : I indicated to the patient that aside from his ecchymosis he has a fairly benign knee exam with full range of motion and no obvious tenderness no disruption of the extensor mechanism no pain with medial or lateral joint line.  I recommend the patient just simply ice the area take Tylenol for discomfort he will follow-up in 10 days to 2 weeks he is noticing significant improvement in his current symptoms.  This consultation was 30 minutes.

## 2024-06-04 NOTE — REASON FOR VISIT
[Follow-Up Visit] : a follow-up visit for [Knee Pain] : knee pain [FreeTextEntry2] : bilateral knee pain. he fell on both knees, went to the er to make sure he is okay and also following up here.

## 2024-06-18 ENCOUNTER — APPOINTMENT (OUTPATIENT)
Dept: ORTHOPEDIC SURGERY | Facility: CLINIC | Age: 74
End: 2024-06-18
Payer: MEDICARE

## 2024-06-18 DIAGNOSIS — S80.12XD CONTUSION OF LEFT KNEE, SUBSEQUENT ENCOUNTER: ICD-10-CM

## 2024-06-18 DIAGNOSIS — S80.02XD CONTUSION OF LEFT KNEE, SUBSEQUENT ENCOUNTER: ICD-10-CM

## 2024-06-18 PROCEDURE — 99214 OFFICE O/P EST MOD 30 MIN: CPT

## 2024-06-18 NOTE — REASON FOR VISIT
[Follow-Up Visit] : a follow-up visit for [Knee Pain] : knee pain [FreeTextEntry2] : CONTUSION OF LEFT KNEE.

## 2024-06-18 NOTE — PHYSICAL EXAM
Patient seen for follow-up medical care, medical diagnoses, and treatment review and discussed as well as previous laboratory tests.  Today she is presenting with laboratory test that essentially within normal limits. HDL is slightly low to take omega-3 2 g twice a day. Lower back pain with exacerbation she needs to go to the spine program. As far as the weight gain she needs to monitor her diet carefully lower carbohydrate diet and lower caloric intake as well as increasing her exercise program. She understands this but has a hard time doing it. Join [employer]. Differential diagnosis as detailed below and plan of care as discussed. Recheck again in 5 months  Diagnoses and all orders for this visit:  Essential hypertension  Hyperglycemia  History of anemia  Mixed hyperlipidemia with low HDL to increase omega-3 2 g twice a day.  Hepatic steatosis  Chronic midline low back pain without sciatica  Cervical spine pain  Varicose veins of both lower extremities with pain  Seasonal allergic rhinitis due to other allergic trigger  GREYSON (obstructive sleep apnea)  Age-related osteoporosis without current pathological fracture  Esophagitis, reflux  Arthritis  Acute exacerbation of chronic low back pain  -     SERVICE TO [facility]  Hemorrhoids, internal, thrombosed apply preparation H with hydrocortisone cream 1% if not better patient needs to see the surgeon for further treatment  Other orders  -     psyllium (METAMUCIL) 58.6 % powder for oral suspension; 1 tablespoon twice a day with 8 ounces of water       [de-identified] : Left knee has significant less swelling less effusion there is no ecchymosis there is some mild ecchymosis mostly yellowish in the mid shin area and foot.  Range of motion is stated above 0-1 105 to 100 degrees.

## 2024-06-18 NOTE — DISCUSSION/SUMMARY
[de-identified] : Patient has no significant improvement since his last visit.  There is significant reduction in both ecchymosis and swelling.  I encouraged him to continue his exercise regimen and physical therapy.  Continue to take Tylenol fairly consistently for the next 3 to 4 days.  He will follow-up in 2 weeks time for final check.  If the patient continues to show restriction of flexion and MRI at that point may be warranted.  This consultation lasted 30 minutes.

## 2024-06-18 NOTE — HISTORY OF PRESENT ILLNESS
[de-identified] : Patient returns today seen most recently May 30 status post fall onto his left knee he is here to have it evaluated.  Overall patient feels significant improvement less swelling less pain still has some restriction of terminal bending of the left knee to approximately 105 degrees compared to 130 on the contralateral side.

## 2024-06-25 ENCOUNTER — APPOINTMENT (OUTPATIENT)
Dept: PHYSICAL MEDICINE AND REHAB | Facility: CLINIC | Age: 74
End: 2024-06-25
Payer: MEDICARE

## 2024-06-25 DIAGNOSIS — M79.18 MYALGIA, OTHER SITE: ICD-10-CM

## 2024-06-25 DIAGNOSIS — M48.062 SPINAL STENOSIS, LUMBAR REGION WITH NEUROGENIC CLAUDICATION: ICD-10-CM

## 2024-06-25 DIAGNOSIS — M47.812 SPONDYLOSIS W/OUT MYELOPATHY OR RADICULOPATHY, CERVICAL REGION: ICD-10-CM

## 2024-06-25 PROCEDURE — 20552 NJX 1/MLT TRIGGER POINT 1/2: CPT

## 2024-06-25 PROCEDURE — 99213 OFFICE O/P EST LOW 20 MIN: CPT | Mod: 25

## 2024-07-09 ENCOUNTER — APPOINTMENT (OUTPATIENT)
Dept: ORTHOPEDIC SURGERY | Facility: CLINIC | Age: 74
End: 2024-07-09

## 2024-07-09 DIAGNOSIS — M25.511 PAIN IN RIGHT SHOULDER: ICD-10-CM

## 2024-07-09 PROCEDURE — 99214 OFFICE O/P EST MOD 30 MIN: CPT | Mod: 25

## 2024-07-09 PROCEDURE — 20610 DRAIN/INJ JOINT/BURSA W/O US: CPT | Mod: RT

## 2024-07-18 ENCOUNTER — APPOINTMENT (OUTPATIENT)
Dept: ORTHOPEDIC SURGERY | Facility: CLINIC | Age: 74
End: 2024-07-18

## 2024-07-18 DIAGNOSIS — M75.41 IMPINGEMENT SYNDROME OF RIGHT SHOULDER: ICD-10-CM

## 2024-07-18 PROCEDURE — 99214 OFFICE O/P EST MOD 30 MIN: CPT | Mod: 25

## 2024-07-18 PROCEDURE — 20611 DRAIN/INJ JOINT/BURSA W/US: CPT | Mod: RT

## 2024-08-20 DIAGNOSIS — M25.811 OTHER SPECIFIED JOINT DISORDERS, RIGHT SHOULDER: ICD-10-CM

## 2024-08-26 RX ORDER — GABAPENTIN 100 MG/1
100 CAPSULE ORAL
Qty: 30 | Refills: 0 | Status: ACTIVE | COMMUNITY
Start: 2024-08-26 | End: 1900-01-01

## 2024-09-02 NOTE — ASU PREOP CHECKLIST - HEART RATE (BEATS/MIN)
[facility] [place] [address], [place] [address]  P [phone]  F [phone]  SALOMÓN, hypertension, hyperlipidemia, diabetes, hepatomegaly, localized prostatic hyperplasia with lower urinary tract symptoms (LUTS)    • CAD (coronary artery disease)     status post PCI 2013 of the RCA and circumflex with moderate LAD disease (Circ-Stemi in ); Cath [facility] 2016 stable CAD and normal RHC mild LV dysfunction EF 40-50% by stress and echo 2016 with likely old Inferior MI ( circ stemi).   • Chronically elevated hemidiaphragm     right   • CKD (chronic kidney disease), stage III (CMS/HCC)    • [facility] heart [facility] (CMS[facility]HCC)     [facility]    • CVA (cerebral vascular accident) (CMS/HCC)     post PCI - daughter states CVA in renetta region, lifelong anticoagulation + permAF   • DM (diabetes mellitus) (CMS/HCC)    • Essential hypertension    • Hearing loss of both ears    • Ischemic cardiomyopathy     NYHA class III EF 51%   • Mixed hyperlipidemia    • Moderate major depression (CMS/HCC)    • Permanent atrial fibrillation (CMS/HCC)    • Presence of cardiac pacemaker     CHB, symptomatic bradycardia s/p [facility] STJ Dr. Brown    • Sleep apnea in adult    • Varicose veins of legs     + reflux study  (bilateral)       History reviewed. No pertinent surgical history.    FAMILY HISTORY  Family History   Problem Relation Age of Onset   • Hypertension Sister    • Diabetes Sister    • Other Sister         Open heart   • Hypertension Brother    • Diabetes Brother    • Other Brother         Open heart surgery   • Hypertension Son    • Hyperlipidemia Son    • Hypertension Son    • Hyperlipidemia Son    • Hypertension Son        SOCIAL HISTORY  Social History     Tobacco Use   • Smoking status: Former Smoker     Packs/day: 1.00     Years: 20.00     Pack years: 20.00     Types: Cigarettes     Quit date:      Years since quittin.5   • Smokeless tobacco: Never Used   Vaping Use   • Vaping Use: never used   Substance Use Topics   • Alcohol use: No   • Drug use: No        REVIEW OF SYSTEMS    Constitutional:  Fatigue at baseline, improved since  infection or drainage.   Bilateral LE with becca discoloration changes, hairless, multiple scabs present without active drainage.  No open lower extremity wounds  Bilateral AT pulses +, unable to palpate PT/DP.   Psychiatric: Normal mood and affect. Behavior is normal.     CARDIAC TESTING/IMAGING  I have reviewed the pertinent imaging study reports. These are the pertinent findings:  · ECG performed and personally reviewed today -   · Labs -  · 5/7/21: NT proBNP 8739, sodium 135, potassium 4.1, glucose 118, BUN 16, creatinine 0.94, magnesium 2.1  · 4/17/21: BUN 23, Creat 1.04, Na 134 ,K 4.9, Mg 2.0  · 4/8/21: NT-proBNP 7548  · Lipids -   · 2/19/20: , , HDL 41, LDL 58  · TTE -   ? 5/10/21: Normal LV size.  Concentric hypertrophy.  Ejection fraction 60%.  Massive left atrial enlargement. Mild to moderate MR. Right atrium enlargement. Pacemaker lead noted in the RV, normal size and function.  Moderate MO with pulmonary hypertension. PAP 61 mmHg.   ? 4/8/21: Left ventricle: The cavity size is normal. Wall thickness is increased. There is mild concentric hypertrophy. The ejection fraction was measured by biplane method of disks. The ejection fraction is 58%. Mitral valve: Trivial regurgitation. Left atrium: The atrium is severely dilated. Right ventricle: Pacemaker lead noted in right ventricle.  ? 12/20/20: mild LVH. EF 55%. Mild AR. Mild MAC. severely dilated LA. Peak systolic pressure 38mmHg. Mild/mod MO, mild TR. 5/26/20: LV cavity size is nml. Mild concentric hypertrophy. EF fraction is 55-60%. Aortic valve: mild regurgitation. LA is moderately to severely dilated. RV estimated peak pressure is 54 mm Hg. Mild regurgitation in tricuspid valve. Compared to previous study. RVSP is increased.   ? 11/19: Unchanged from previous study. Normal LV size and systolic function with EF 55-60%. There is severe left atrial enlargement. Diastolic funciton is abnormal with e/o increased left atrial pressure. Right  ventricular EF is normal however there are subtle signs of right ventricular dysfunction. Right ventricular systolic pressure is mildly elevated based on available data provided however this is likely underestimated. Right atrium is moderately enlarged. Mitral annular calcification with mild mitral regurgitation. Aortic valve sclerosis. No stenosis or insufficiency.   ? 6/16: Normal left ventricular size and mildly reduced systolic function with HK of basal inferior and inferolateral walls, Aortic valve sclerosis without stenosis. Trivial aortic insufficiency. Mitral annular calcification. Biatrial enlargement  · Stress Test -   · 6/16: normal with low normal EF 50%  · Coronary angiography -   · 6/16:  [facility] 8/16- mild LAD disease ~ 50%, OM2 80% no other CAD EF ~ 50% by LV gram.  · Stress Test -   · Cardiac MRI -    · Coronary angiography -       ASSESSMENT/PLAN  Presence of cardiac pacemaker  Mr. Woodall's single chamber pacemaker is functioning well and without complication. No device interrogation performed today - see note 4/23/21 for post operative wound check details.     [facility] heart block (CMS/HCC)  Ventricular paced 93%. No R waves - pace dependent.   Occasional PVCs (asymptomatic) noted.     Ischemic cardiomyopathy   Recovered EF. LVEF 60%, NYHA Class 2, Stage C.  euvolemic on exam.  On guideline directed medical therapy in the form of:   BB:none known now that has permanent pacemaker if needed in the future can institute beta-blocker therapy.  ACEI/ARB/ARNI:enalapril 10mg day  MRA:eplerenone 25mg day  SGLT-2i: none  Diuretics:bumatenide 1mg day  Hydralazine/Nitrates:none  Digoxin:none  Devices: no ICD, has ppm     Permanent atrial fibrillation   Rate is controlled. No falls or bleeding issues therefore no changes needed.   On life-long anticoagulation and is on warfarin therapy with h/o CVA.     Atherosclerotic heart disease of native coronary artery without angina pectoris   [facility] 8/16- mild LAD disease ~  50%, OM2 80% no other CAD EF ~ 50% by LV gram.  Stress test in November 2019 normal.   he is stable with respect to chest pain and on the appropriate cardiac medications and moderately physically active.   No aspirin needed due to increased bleeding risk as is on warfarin.     Congestive heart failure   Appears euvolemic on exam.      Diabetes   Managed by pcp. Last A1C controlled at 5.8% (POC testing 05/2021)    Hypertension   Blood pressure is well controlled.  Reviewed DASH diet and discussed strategies to limit salt in the patient's diet to <2000mg/day.      Hyperlipidemia LDL goal <70   Continue statin. Overdue for lipids, order provided today.     Varicose veins of bilateral lower extremities with other complications   Complications post left leg vein ablation- wound is now healed.   Follow up with Dr. Fernando PITT.      SALOMÓN (obstructive sleep apnea)   Noncomplaint with CPAP. Does have difficulty sleeping and would feel better once SALOMÓN treated.  He will consider retrying his mask but still has not.   Depressed RV function on echo with elevated PAP and SALOMÓN untreated is contributing.      Wound of right leg    This has healed.   He has no pain or evidence of limb ischemia.     Frequent falls  This is improved;likely due to deconditioning. Was in physical therapy and is getting stronger no more falls.     There are no discontinued medications.      Return in about 4 months (around 11/13/2021) for Routine cardiovascular follow-up.  With Dr. Raina Loja, FNP-BC, CVNP-BC  [facility]  [facility]    Plan of care discussed with the patient.  All questions were answered.  Patient verbalized understanding and agrees with the plan    This note was created by [employer] voice recognition. Errors in content may be related to improper recognition by the system; efforts to review and correct have been done but errors may still exist.    A letter has been sent to the referring  physician                Spouse/Significant other 62

## 2024-09-05 ENCOUNTER — APPOINTMENT (OUTPATIENT)
Dept: ORTHOPEDIC SURGERY | Facility: CLINIC | Age: 74
End: 2024-09-05
Payer: MEDICARE

## 2024-09-05 DIAGNOSIS — M25.811 OTHER SPECIFIED JOINT DISORDERS, RIGHT SHOULDER: ICD-10-CM

## 2024-09-05 DIAGNOSIS — M75.41 IMPINGEMENT SYNDROME OF RIGHT SHOULDER: ICD-10-CM

## 2024-09-05 PROCEDURE — 99214 OFFICE O/P EST MOD 30 MIN: CPT

## 2024-09-05 NOTE — HISTORY OF PRESENT ILLNESS
[de-identified] : LOCATION: RIGHT SHOULDER PAIN-RHD  DURATION: PAIN STARTED 4-5 MONTHS AGO PT HAS 2 CORTISONE INJECTIONS IWTH DR. QIU  QUALITY: SHARP OCCASIOJNALLY  LATERAL ARM  RADIATING PAIN DOWN ARM/ LOCALIZED  INTERMITTENT  TREATMENTS: PATIENT HAS TRIED REST AGGRAVATING FACTORS: PAIN WORSENS WITH OVER HEAD LIFTING, REACHING BAHIND,  SORENESS PAIN WORSE DURING THE DAY IN MORNING  NO ASSOCIATED SYMPTOMS: NUMBNESS / TINGLING  PRIOR STUDIES:

## 2024-09-05 NOTE — HISTORY OF PRESENT ILLNESS
[de-identified] : LOCATION: RIGHT SHOULDER PAIN-RHD  DURATION: PAIN STARTED 4-5 MONTHS AGO PT HAS 2 CORTISONE INJECTIONS IWTH DR. QIU  QUALITY: SHARP OCCASIOJNALLY  LATERAL ARM  RADIATING PAIN DOWN ARM/ LOCALIZED  INTERMITTENT  TREATMENTS: PATIENT HAS TRIED REST AGGRAVATING FACTORS: PAIN WORSENS WITH OVER HEAD LIFTING, REACHING BAHIND,  SORENESS PAIN WORSE DURING THE DAY IN MORNING  NO ASSOCIATED SYMPTOMS: NUMBNESS / TINGLING  PRIOR STUDIES:

## 2024-09-05 NOTE — HISTORY OF PRESENT ILLNESS
[de-identified] : LOCATION: RIGHT SHOULDER PAIN-RHD  DURATION: PAIN STARTED 4-5 MONTHS AGO PT HAS 2 CORTISONE INJECTIONS IWTH DR. QIU  QUALITY: SHARP OCCASIOJNALLY  LATERAL ARM  RADIATING PAIN DOWN ARM/ LOCALIZED  INTERMITTENT  TREATMENTS: PATIENT HAS TRIED REST AGGRAVATING FACTORS: PAIN WORSENS WITH OVER HEAD LIFTING, REACHING BAHIND,  SORENESS PAIN WORSE DURING THE DAY IN MORNING  NO ASSOCIATED SYMPTOMS: NUMBNESS / TINGLING  PRIOR STUDIES:

## 2024-09-06 NOTE — DISCUSSION/SUMMARY
[de-identified] : PATIENT INITIALLY SEEN BY DR. JIM GILLESPIE IS SCHEDULED FOR A BIOPSY OF A SOFT TISSUE MASS SEEN ON SHOULDER MRI.  BIOPSY SCHEDULED FOR MONDAY WILL FOLLOW-UP WITH DR. MIGUEL TOM ORTHOPEDIC ONCOLOGY SPECIALIST AT [facility]  PATIENT HAS HISTORY OF CHRONIC SHOULDER PAIN SEPARATE FROM THIS MASS.  THE MASS WAS AN INCIDENTAL FINDING ON CT SCAN.  PATIENT IS NOT A CANDIDATE FOR MRI BECAUSE OF PACEMAKER.  I HAVE ORDERED A CT ARTHROGRAM OF THE RIGHT SHOULDER TO EVALUATE THE INTEGRITY OF ROTATOR CUFF AND ARTICULAR STRUCTURES.  THIS SHOULD NOT BE PERFORMED UNTIL AFTER BIOPSY IS COMPLETED AND TREATMENT EVALUATION BY DR. MIGUEL TOM IS COMPLETED

## 2024-09-06 NOTE — PHYSICAL EXAM
[de-identified] : PHYSICAL EXAM  RIGHT  SHOULDER  NECK EXAM  FROM NONTENDER  SPURLING  RIGHT=NEG, LEFT=NEG  4 x 2 CM NONTENDER MASS ANTERIOR DELTOID  NORMAL POSTURE  AROM 140 / 140 / 90 / 30 TENDER: SA REGION  SPECIAL TESTING : JORGE - POSITIVE  MICHELLE - POSITIVE  SPEED TEST - POSITIVE  STEVE - NEGATIVE  APPREHENSION AND SUPPRESSION - NEGATIVE   RC STRENGTH TESTING  SS:  5/5 SUB 5/5 IS     5/5 BICEPS  5/5  SENSATION  - GROSSLY INTACT

## 2024-09-06 NOTE — DISCUSSION/SUMMARY
[de-identified] : PATIENT INITIALLY SEEN BY DR. JIM GILLESPIE IS SCHEDULED FOR A BIOPSY OF A SOFT TISSUE MASS SEEN ON SHOULDER MRI.  BIOPSY SCHEDULED FOR MONDAY WILL FOLLOW-UP WITH DR. MIGUEL TOM ORTHOPEDIC ONCOLOGY SPECIALIST AT [facility]  PATIENT HAS HISTORY OF CHRONIC SHOULDER PAIN SEPARATE FROM THIS MASS.  THE MASS WAS AN INCIDENTAL FINDING ON CT SCAN.  PATIENT IS NOT A CANDIDATE FOR MRI BECAUSE OF PACEMAKER.  I HAVE ORDERED A CT ARTHROGRAM OF THE RIGHT SHOULDER TO EVALUATE THE INTEGRITY OF ROTATOR CUFF AND ARTICULAR STRUCTURES.  THIS SHOULD NOT BE PERFORMED UNTIL AFTER BIOPSY IS COMPLETED AND TREATMENT EVALUATION BY DR. MIGUEL TOM IS COMPLETED

## 2024-09-06 NOTE — PHYSICAL EXAM
[de-identified] : PHYSICAL EXAM  RIGHT  SHOULDER  NECK EXAM  FROM NONTENDER  SPURLING  RIGHT=NEG, LEFT=NEG  4 x 2 CM NONTENDER MASS ANTERIOR DELTOID  NORMAL POSTURE  AROM 140 / 140 / 90 / 30 TENDER: SA REGION  SPECIAL TESTING : JORGE - POSITIVE  MICHELLE - POSITIVE  SPEED TEST - POSITIVE  STEVE - NEGATIVE  APPREHENSION AND SUPPRESSION - NEGATIVE   RC STRENGTH TESTING  SS:  5/5 SUB 5/5 IS     5/5 BICEPS  5/5  SENSATION  - GROSSLY INTACT

## 2024-09-06 NOTE — DISCUSSION/SUMMARY
[de-identified] : PATIENT INITIALLY SEEN BY DR. JIM GILLESPIE IS SCHEDULED FOR A BIOPSY OF A SOFT TISSUE MASS SEEN ON SHOULDER MRI.  BIOPSY SCHEDULED FOR MONDAY WILL FOLLOW-UP WITH DR. MIGUEL TOM ORTHOPEDIC ONCOLOGY SPECIALIST AT [facility]  PATIENT HAS HISTORY OF CHRONIC SHOULDER PAIN SEPARATE FROM THIS MASS.  THE MASS WAS AN INCIDENTAL FINDING ON CT SCAN.  PATIENT IS NOT A CANDIDATE FOR MRI BECAUSE OF PACEMAKER.  I HAVE ORDERED A CT ARTHROGRAM OF THE RIGHT SHOULDER TO EVALUATE THE INTEGRITY OF ROTATOR CUFF AND ARTICULAR STRUCTURES.  THIS SHOULD NOT BE PERFORMED UNTIL AFTER BIOPSY IS COMPLETED AND TREATMENT EVALUATION BY DR. MIGUEL TMO IS COMPLETED

## 2024-09-06 NOTE — PHYSICAL EXAM
[de-identified] : PHYSICAL EXAM  RIGHT  SHOULDER  NECK EXAM  FROM NONTENDER  SPURLING  RIGHT=NEG, LEFT=NEG  4 x 2 CM NONTENDER MASS ANTERIOR DELTOID  NORMAL POSTURE  AROM 140 / 140 / 90 / 30 TENDER: SA REGION  SPECIAL TESTING : JORGE - POSITIVE  MICHELLE - POSITIVE  SPEED TEST - POSITIVE  STEVE - NEGATIVE  APPREHENSION AND SUPPRESSION - NEGATIVE   RC STRENGTH TESTING  SS:  5/5 SUB 5/5 IS     5/5 BICEPS  5/5  SENSATION  - GROSSLY INTACT

## 2024-10-15 ENCOUNTER — TRANSCRIPTION ENCOUNTER (OUTPATIENT)
Age: 74
End: 2024-10-15

## 2024-11-01 ENCOUNTER — APPOINTMENT (OUTPATIENT)
Dept: ORTHOPEDIC SURGERY | Facility: CLINIC | Age: 74
End: 2024-11-01
Payer: MEDICARE

## 2024-11-01 VITALS
DIASTOLIC BLOOD PRESSURE: 73 MMHG | SYSTOLIC BLOOD PRESSURE: 114 MMHG | HEIGHT: 67 IN | OXYGEN SATURATION: 99 % | BODY MASS INDEX: 28.09 KG/M2 | WEIGHT: 179 LBS | HEART RATE: 76 BPM | TEMPERATURE: 98.2 F

## 2024-11-01 PROCEDURE — 99214 OFFICE O/P EST MOD 30 MIN: CPT

## 2025-03-07 ENCOUNTER — APPOINTMENT (OUTPATIENT)
Dept: ORTHOPEDIC SURGERY | Facility: CLINIC | Age: 75
End: 2025-03-07

## 2025-03-24 ENCOUNTER — APPOINTMENT (OUTPATIENT)
Dept: ORTHOPEDIC SURGERY | Facility: CLINIC | Age: 75
End: 2025-03-24

## 2025-03-24 DIAGNOSIS — M25.562 PAIN IN RIGHT KNEE: ICD-10-CM

## 2025-03-24 DIAGNOSIS — M25.561 PAIN IN RIGHT KNEE: ICD-10-CM

## 2025-03-24 PROCEDURE — 99214 OFFICE O/P EST MOD 30 MIN: CPT | Mod: 25

## 2025-03-24 PROCEDURE — 73562 X-RAY EXAM OF KNEE 3: CPT | Mod: 50

## 2025-03-24 PROCEDURE — 20611 DRAIN/INJ JOINT/BURSA W/US: CPT | Mod: RT

## 2025-04-01 ENCOUNTER — APPOINTMENT (OUTPATIENT)
Dept: ORTHOPEDIC SURGERY | Facility: CLINIC | Age: 75
End: 2025-04-01

## 2025-04-01 DIAGNOSIS — M17.11 UNILATERAL PRIMARY OSTEOARTHRITIS, RIGHT KNEE: ICD-10-CM

## 2025-04-01 PROCEDURE — 20611 DRAIN/INJ JOINT/BURSA W/US: CPT | Mod: RT

## 2025-04-01 PROCEDURE — 99214 OFFICE O/P EST MOD 30 MIN: CPT | Mod: 25

## 2025-08-01 ENCOUNTER — APPOINTMENT (OUTPATIENT)
Dept: ORTHOPEDIC SURGERY | Facility: CLINIC | Age: 75
End: 2025-08-01

## 2025-08-01 DIAGNOSIS — M17.11 UNILATERAL PRIMARY OSTEOARTHRITIS, RIGHT KNEE: ICD-10-CM

## 2025-08-01 PROCEDURE — 99214 OFFICE O/P EST MOD 30 MIN: CPT | Mod: 25

## 2025-08-01 PROCEDURE — 20610 DRAIN/INJ JOINT/BURSA W/O US: CPT | Mod: RT

## 2025-08-01 RX ORDER — HYALURONATE SODIUM, STABILIZED 88 MG/4 ML
88 SYRINGE (ML) INTRAARTICULAR
Qty: 1 | Refills: 0 | Status: ACTIVE | OUTPATIENT
Start: 2025-08-01

## 2025-08-26 ENCOUNTER — APPOINTMENT (OUTPATIENT)
Dept: ORTHOPEDIC SURGERY | Facility: CLINIC | Age: 75
End: 2025-08-26

## 2025-08-26 DIAGNOSIS — M17.11 UNILATERAL PRIMARY OSTEOARTHRITIS, RIGHT KNEE: ICD-10-CM

## 2025-08-26 DIAGNOSIS — E66.3 OVERWEIGHT: ICD-10-CM
